# Patient Record
Sex: FEMALE | Race: WHITE
[De-identification: names, ages, dates, MRNs, and addresses within clinical notes are randomized per-mention and may not be internally consistent; named-entity substitution may affect disease eponyms.]

---

## 2017-11-14 ENCOUNTER — HOSPITAL ENCOUNTER (OUTPATIENT)
Dept: HOSPITAL 53 - M RAD | Age: 27
End: 2017-11-14
Attending: NURSE PRACTITIONER
Payer: OTHER GOVERNMENT

## 2017-11-14 DIAGNOSIS — R10.2: Primary | ICD-10-CM

## 2017-11-14 DIAGNOSIS — N92.5: ICD-10-CM

## 2017-11-14 NOTE — REP
Clinical:  Pelvic pain .

 

Technique:  Transabdominal pelvic ultrasound followed by transvaginal examination

for better evaluation of the endometrium and adnexa with color Doppler evaluation

of the ovaries.

 

Findings:

 

Bladder is unremarkable and measures 3.5 x 1.4 x 4.4 cm .

 

Normal anteverted uterus measures 9.4 x 4.5 x 6.6 cm.  The endometrial complex

measures 13.6 mm thickness.  No discrete uterine or endometrial abnormalities are

appreciated. Trace endocervical fluid is nonspecific and likely physiologic.

 

Bilateral ovaries are normal in appearance and vascularity without evidence for

torsion.  Right ovary measures 3.3 x 1.8 x 2.5 cm ; R I = 0.60 .  Left ovary

measures 3.0 x 1.7 x 2.5 cm ; R I = 0.59 .

 

No pelvic fluid or adnexal mass lesion

 

Impression:

1.  Normal pelvic ultrasound.  No torsion.

 

 

Signed by

Obdulio Serra MD 11/14/2017 07:09 P

## 2018-05-01 ENCOUNTER — HOSPITAL ENCOUNTER (OUTPATIENT)
Dept: HOSPITAL 53 - M LRY | Age: 28
End: 2018-05-01
Attending: NURSE PRACTITIONER
Payer: COMMERCIAL

## 2018-05-01 DIAGNOSIS — S49.91XA: Primary | ICD-10-CM
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
